# Patient Record
Sex: FEMALE | Race: WHITE | NOT HISPANIC OR LATINO | Employment: FULL TIME | URBAN - METROPOLITAN AREA
[De-identification: names, ages, dates, MRNs, and addresses within clinical notes are randomized per-mention and may not be internally consistent; named-entity substitution may affect disease eponyms.]

---

## 2021-11-12 ENCOUNTER — TRANSCRIBE ORDERS (OUTPATIENT)
Dept: LAB | Facility: HOSPITAL | Age: 31
End: 2021-11-12

## 2021-11-12 DIAGNOSIS — Z01.818 OTHER SPECIFIED PRE-OPERATIVE EXAMINATION: Primary | ICD-10-CM

## 2021-11-17 RX ORDER — ESCITALOPRAM OXALATE 20 MG/1
20 TABLET ORAL DAILY
COMMUNITY

## 2021-11-17 RX ORDER — BUSPIRONE HYDROCHLORIDE 15 MG/1
15 TABLET ORAL DAILY
COMMUNITY

## 2021-11-17 RX ORDER — ASPIRIN 81 MG/1
81 TABLET, CHEWABLE ORAL DAILY
COMMUNITY

## 2021-11-17 RX ORDER — PRENATAL VIT NO.126/IRON/FOLIC 28MG-0.8MG
1 TABLET ORAL DAILY
COMMUNITY

## 2021-11-18 ENCOUNTER — TRANSCRIBE ORDERS (OUTPATIENT)
Dept: ADMINISTRATIVE | Facility: HOSPITAL | Age: 31
End: 2021-11-18

## 2021-11-18 ENCOUNTER — LAB (OUTPATIENT)
Dept: LAB | Facility: HOSPITAL | Age: 31
End: 2021-11-18

## 2021-11-18 DIAGNOSIS — Z01.818 OTHER SPECIFIED PRE-OPERATIVE EXAMINATION: Primary | ICD-10-CM

## 2021-11-18 PROBLEM — G56.01 CARPAL TUNNEL SYNDROME OF RIGHT WRIST: Status: ACTIVE | Noted: 2021-11-18

## 2021-11-18 LAB — SARS-COV-2 RNA PNL SPEC NAA+PROBE: NOT DETECTED

## 2021-11-18 PROCEDURE — C9803 HOPD COVID-19 SPEC COLLECT: HCPCS | Performed by: STUDENT IN AN ORGANIZED HEALTH CARE EDUCATION/TRAINING PROGRAM

## 2021-11-18 PROCEDURE — 87635 SARS-COV-2 COVID-19 AMP PRB: CPT | Performed by: STUDENT IN AN ORGANIZED HEALTH CARE EDUCATION/TRAINING PROGRAM

## 2021-11-18 NOTE — DISCHARGE INSTRUCTIONS
Orthopaedic & Hand Surgery  Carpal Tunnel Release Discharge Instructions  Dr. Priyank Kwok  [phone]    • INCISION CARE  o Wash your hands prior to dressing changes  o The postoperative dressings should be taken off starting on postoperative day #4. New dry dressings can then be placed around the wound and held in place with an Ace wrap. Dressings should be changed daily.  o No creams or ointments to the incision until 4 weeks post op.   o May remove dressing once the incision is completely free of drainage. But need to wait at least a week after surgery.  o Do not touch or pick at the incision  o Check incision every day and notify surgeon immediately if any of the following signs or symptoms are seen:  - Increase in redness  - Increase in swelling around the incision and of the entire extremity  - Increase in pain  - NEW drainage or oozing from the incision  - Pulling apart of the edges of the incision  - Increase in overall body temperature (greater than 100.4°F)  o Your surgeon will instruct you regarding suture or staple removal. In general, this happens at the 1-2-week post op appointment.    • ACTIVITIES  o Exercises:  - Physical therapy may or may not be needed after surgery. Once some healing has occurred, it will be possible to start therapy if you wish. However, most patients get their function back fairly well after surgery without formal therapy.  o Activities of Daily Living:   - Showering may begin immediately if the wrist can be protected. The splint and incision cannot get wet after surgery.   - No tub baths, hot tubs, or swimming pools for 4 weeks.  - May shower and let water run over the incision once the sutures are removed if there is no drainage and the wound is well healing. A new dry dressing can be applied after showering.     • Restrictions  o Weight: Do not put weight on the wrist until instructed to do so. Your surgeon will discuss with restrictions in terms of activities  allowed. In general, anything more strenuous than holding a pen or piece of paper should be avoided, the other wrist should do the vast majority of the work until the soft tissues have healed enough that it is not painful, then it is ok to use the wrist more strenuously.   o Driving: Many patients have questions about when it is safe to return to driving. The answer is that this is extremely variable. It depends on how quickly you heal. Until you can safely navigate the steering wheel, and are off of all narcotics, driving is not permitted. Your surgeon cannot “clear” you to return to driving, only you can make the decision when you feel it is safe.     • Pain Control  o Ice:  - Ice is an excellent pain reliever. This can be used regardless of whether or not you are taking pain medication.  - Apply an ice pack to the surgical area for 20 minutes at a time, removing it for at least an hour to prevent frostbite.  - You should keep a towel between any dressings on the ice pack to prevent them from getting damp and from developing frostbite on the operative site.  o Elevation:  - Elevation is another easy way to control pain after surgery. Whenever possible, keep the operative limb elevated above the level of your heart to reduce swelling.  o Acetaminophen (Tylenol):  - CLASSIFICATION: A non-narcotic medication that is available without a prescription.  • Acetaminophen controls pain but does not affect swelling or inflammation.  - DRIVING: Acetaminophen will not impair your ability to drive. It is safe to drive while taking if your physical condition does not limit you.  - POTENTIAL SIDE EFFECTS: nausea, stomach upset, liver failure if taken in large doses.  - Interactions: Some narcotic medications contain acetaminophen. If you have a narcotic prescription, make sure to cut back on the acetaminophen if you are taking the narcotic. You should never take more 3000 mg of acetaminophen in one 24-hour  period.  - DOSAGE:  • Following surgery, you may take two regular strength (325 mg) tabs to control pain every 6 hours. This can be taken with NSAIDs (see below) or alternating the two.  • After the initial surgical pain begins to resolve, you may begin to decrease the pain medication. By the end of a few weeks, you should be off of pain medications.  o NSAIDS: This includes aspirin, ibuprofen, naproxen, Motrin, Aleve, Mobic, Celebrex  - CLASSIFICATION: These are non-narcotic medications that are available without a prescription.  • They are particularly effective at reducing swelling and inflammation  - DRIVING: These medications will not impair your ability to drive. It is safe to drive while taking these medications if your physical condition does not limit you.  - POTENTIAL SIDE EFFECTS: nausea, stomach upset, ulcer, gastric bleeding, kidney failure.  - DOSAGE:  • Following surgery, you may take ibuprofen (Motrin) 600 mg to control pain every 6 hours with food. It helps to take it scheduled (around the clock) to allow it to help reduce swelling.  • After the initial surgical pain begins to resolve, you may begin to decrease the pain medication. By the end of a few weeks, you should be off of pain medications.    • Medications  o Anticoagulants: After upper extremity surgery most patients do not require an anticoagulant unless you have another injury that will be keeping you from mobilizing.  - If you were already taking an anticoagulant (commonly Aspirin, Coumadin, or Plavix) you will likely be resuming your normal dose postoperatively and will be continuing that medication at the discretion of the prescribing physician.    • FOLLOW-UP VISITS  o You will need to follow up in the office with your surgeon in 1-2 weeks, or as instructed elsewhere in your discharge paperwork. Please call this number [phone] to schedule this appointment if one has not already been made.  o If you have any concerns or suspected  complications prior to your follow up visit, please call the office. Do not wait until your appointment time if you suspect complications. These will need to be addressed in the office promptly.      Priyank Kwok MD, PhD  Orthopaedic Surgery  [facility]

## 2021-11-19 ENCOUNTER — HOSPITAL ENCOUNTER (OUTPATIENT)
Facility: HOSPITAL | Age: 31
Setting detail: HOSPITAL OUTPATIENT SURGERY
Discharge: HOME | End: 2021-11-19
Attending: STUDENT IN AN ORGANIZED HEALTH CARE EDUCATION/TRAINING PROGRAM | Admitting: STUDENT IN AN ORGANIZED HEALTH CARE EDUCATION/TRAINING PROGRAM

## 2021-11-19 VITALS
OXYGEN SATURATION: 97 % | HEIGHT: 65 IN | SYSTOLIC BLOOD PRESSURE: 141 MMHG | RESPIRATION RATE: 16 BRPM | HEART RATE: 91 BPM | BODY MASS INDEX: 41.82 KG/M2 | DIASTOLIC BLOOD PRESSURE: 95 MMHG | WEIGHT: 251 LBS | TEMPERATURE: 98.3 F

## 2021-11-19 RX ORDER — CHOLECALCIFEROL (VITAMIN D3) 125 MCG
500 CAPSULE ORAL DAILY
COMMUNITY

## 2021-11-19 RX ORDER — MAGNESIUM HYDROXIDE 1200 MG/15ML
LIQUID ORAL AS NEEDED
Status: DISCONTINUED | OUTPATIENT
Start: 2021-11-19 | End: 2021-11-19 | Stop reason: HOSPADM

## 2021-11-19 NOTE — INTERVAL H&P NOTE
H&P reviewed. The patient was examined and there are no changes to the H&P.    Priyank Kwok MD, PhD  Orthopaedic & Hand Surgery  [facility]  [phone] - [place] Office  [phone] - [facility]

## 2021-11-19 NOTE — OP NOTE
Orthopaedic & Hand Surgery  Carpal Tunnel Release Note  Dr. Priyank Kwok  [phone]    PATIENT NAME: Ellen Hernandez  MRN: [id number]  : 1990 AGE: 31 y.o. GENDER: female  DATE OF OPERATION: 2021  PREOPERATIVE DIAGNOSIS: Right Carpal Tunnel Syndrome  POSTOPERATIVE DIAGNOSIS: Right Carpal Tunnel Syndrome  OPERATION PERFORMED: Right Open Carpal Tunnel Release (CPT 37771)  SURGEON: Priyank Kwok MD, PhD  ANESTHESIA: 1% Lidocaine with epinephrine 1:100,000 buffered with bicarb.  ASSISTANT: None  ESTIMATED BLOOD LOSS: Minimal  SPECIMENS: None  SPONGE AND NEEDLE COUNT: Correct  INDICATIONS: This patient is noted to have carpal tunnel syndrome in the affected wrist that failed nonoperative treatment. The risks of surgery were discussed and included the risk of anesthesia, infection, wound healing problems, damage to neurovascular structures, incomplete relief or recurrence, loss of range of motion, the need for further procedures, medical complications, and others. No guarantees were made. The patient voiced understanding and a desire to proceed with surgery. Verbal and written consent were obtained.    DETAILS OF PROCEDURE:  The patient was met in the preoperative area. The site was marked. The consent and H&P were reviewed. The surgical site was then numbed with 1% Lidocaine with epinephrine 1:100,000 buffered with bicarb, 10 ml total. The patient was then transferred to the operative suite.    After induction of a satisfactory anesthetic, the patient was positioned supine with the right arm extended on an arm board. The hand and arm was prepped and draped in normal sterile fashion which included alcohol, chlorhexidine and multiple layers of sterile draping.     A timeout was performed confirming the site and side of surgery, the antibiotic and allergy status and the presence of the necessary surgical equipment.     A one inch incision was designed in the palm in line with the middle/ring web,  making use of an existing crease. 3.5 power loupe magnification and the bipolar cautery were used.     The skin was incised and retracted and hemostasis was achieved. The palmar fascia was divided and the superficial palmar arch protected. The transverse fibers of the flexor retinaculum were then divided under direct vision from distal to proximal. The contents of the carpal tunnel were retracted radially and the proximal ligament and antebrachial fascia were divided, under direct vision to one and one half inches proximal to the wrist crease. This was confirmed visually and palpably with the edge of a freer elevator. Dissection was carried distally to a point visibly and palpably distal to the superficial palmar arch, completely opening the ligament and decompressing the median nerve. The nerve appeared compressed. It was dissected off of the overlying radial side of the flexor retinaculum, to which it was adherent. There were no anomalous branches of the median nerve or masses in the carpal tunnel.     The nerve assumed a red and inflamed appearance following release of the ligament. Bleeding was controlled with the bipolar cautery and the wound irrigated with saline and closed with vertical mattress Prolene sutures and a vessel loop pledget.     Sterile bulky dressings were applied with the thumb and fingers free and the patient was taken to recovery in stable condition. The patient tolerated to procedure well with no immediate complications noted. The estimated blood loss was minimal. No specimens to pathology. Final sponge and instrument count were reported to me as correct.    Priyank Kwok MD, PhD  Orthopaedic & Hand Surgery  [facility]  [phone] - [place] Office  [phone] - [facility]

## (undated) DEVICE — COTTON UNDERCAST PADDING,REGULAR FINISH: Brand: WEBRIL

## (undated) DEVICE — UNDRGLV SURG BIOGEL PUNCTUREINDICATION SZ7 PF STRL

## (undated) DEVICE — GLV SURG BIOGEL LTX PF 7

## (undated) DEVICE — SPNG GZ WOVN 4X4IN 12PLY 10/BX STRL

## (undated) DEVICE — SOL ISO/ALC RUB 70PCT 4OZ

## (undated) DEVICE — SUT PROLN 4/0 FS2 18IN 8683G

## (undated) DEVICE — DRAPE,HAND,STERILE: Brand: MEDLINE

## (undated) DEVICE — DRSNG GZ PETROLTM XEROFORM CURAD 1X8IN STRL

## (undated) DEVICE — BNDG ELAS ELITE V/CLOSE 3IN 5YD LF STRL

## (undated) DEVICE — APPL CHLORAPREP HI/LITE 26ML ORNG

## (undated) DEVICE — DISPOSABLE BIPOLAR FORCEPS 4" (10.2CM) JEWELERS, STRAIGHT 0.4MM TIP AND 12 FT. (3.6M) CABLE: Brand: KIRWAN

## (undated) DEVICE — PK ORTHO MINOR TOWER 40

## (undated) DEVICE — VESSEL LOOPS X-RAY DETECTABLE: Brand: DEROYAL